# Patient Record
Sex: MALE | Race: BLACK OR AFRICAN AMERICAN | Employment: FULL TIME
[De-identification: names, ages, dates, MRNs, and addresses within clinical notes are randomized per-mention and may not be internally consistent; named-entity substitution may affect disease eponyms.]

---

## 2024-05-09 ENCOUNTER — HOSPITAL ENCOUNTER (EMERGENCY)
Facility: HOSPITAL | Age: 39
Discharge: HOME | End: 2024-05-09

## 2024-05-09 VITALS
HEART RATE: 99 BPM | SYSTOLIC BLOOD PRESSURE: 133 MMHG | RESPIRATION RATE: 16 BRPM | OXYGEN SATURATION: 98 % | HEIGHT: 71 IN | BODY MASS INDEX: 25.2 KG/M2 | DIASTOLIC BLOOD PRESSURE: 96 MMHG | WEIGHT: 180 LBS | TEMPERATURE: 98.5 F

## 2024-05-09 DIAGNOSIS — H60.391 INFECTIVE OTITIS EXTERNA OF RIGHT EAR: Primary | ICD-10-CM

## 2024-05-09 PROCEDURE — 99283 EMERGENCY DEPT VISIT LOW MDM: CPT

## 2024-05-09 ASSESSMENT — ENCOUNTER SYMPTOMS
SORE THROAT: 0
WHEEZING: 0
NAUSEA: 0
STRIDOR: 0
DIARRHEA: 0
EYE DISCHARGE: 0
EYE REDNESS: 0
SHORTNESS OF BREATH: 0
VOMITING: 0
ABDOMINAL PAIN: 0
RHINORRHEA: 0
CONSTIPATION: 0
COUGH: 0
BACK PAIN: 0

## 2024-05-09 ASSESSMENT — PAIN - FUNCTIONAL ASSESSMENT: PAIN_FUNCTIONAL_ASSESSMENT: NONE - DENIES PAIN

## 2024-05-09 NOTE — ED NOTES
Patient is discharged at this time. Patient has been updated on plan of care. Patient has to questions or concerns at this time.

## 2024-05-09 NOTE — ED PROVIDER NOTES
content normal.                Diagnostic Study Results     Labs -   No results found for this or any previous visit (from the past 12 hour(s)).    Radiologic Studies -   No orders to display     [unfilled]  [unfilled]      Medical Decision Making   I am the first provider for this patient.    I reviewed the vital signs, available nursing notes, past medical history, past surgical history, family history and social history.    Vital Signs-Reviewed the patient's vital signs.    Records Reviewed: Brie Pearce PA-C     Procedures:  Procedures    Provider Notes (Medical Decision Making): Impression: Otitis externa    Clinical presentation suggestive of otitis externa.  Will plan to discharge patient with Cortisporin otic eardrops with ENT follow-up as needed.  Return precautions advised.  Patient agrees. Brie Pearce PA-C     Dictation disclaimer:  Please note that this dictation was completed with [employer], the [employer] voice recognition software.  Quite often unanticipated grammatical, syntax, homophones, and other interpretive errors are inadvertently transcribed by the computer software.  Please disregard these errors.  Please excuse any errors that have escaped final proofreading.      MED RECONCILIATION:  No current facility-administered medications for this encounter.     Current Outpatient Medications   Medication Sig    neomycin-polymyxin-hydrocortisone (CORTISPORIN) 3.5-72002-0 otic solution Place 4 drops into the right ear 3 times daily for 10 days Instill into R  Ear TID x 10 days       Disposition:  D/c       Medication List        START taking these medications      neomycin-polymyxin-hydrocortisone 3.5-32715-9 otic solution  Commonly known as: CORTISPORIN  Place 4 drops into the right ear 3 times daily for 10 days Instill into R  Ear TID x 10 days               Where to Get Your Medications        These medications were sent to [facility] - [place], [place] - [address] - P [phone] - F

## 2024-05-09 NOTE — ED TRIAGE NOTES
Pt arrived via Triage ambulatory c/o right ear fullness, denies pain for the last week. Pt states it is affecting his hearing.